# Patient Record
Sex: MALE | Race: BLACK OR AFRICAN AMERICAN | Employment: FULL TIME | URBAN - METROPOLITAN AREA
[De-identification: names, ages, dates, MRNs, and addresses within clinical notes are randomized per-mention and may not be internally consistent; named-entity substitution may affect disease eponyms.]

---

## 2020-11-13 ENCOUNTER — HOSPITAL ENCOUNTER (OUTPATIENT)
Dept: PREADMISSION TESTING | Age: 35
Discharge: HOME | End: 2020-11-13
Payer: COMMERCIAL

## 2020-11-13 ENCOUNTER — TRANSCRIBE ORDER (OUTPATIENT)
Dept: REGISTRATION | Age: 35
End: 2020-11-13

## 2020-11-13 DIAGNOSIS — Z01.812 BLOOD TESTS PRIOR TO TREATMENT OR PROCEDURE: ICD-10-CM

## 2020-11-13 DIAGNOSIS — Z20.828 EXPOSURE TO SARS-ASSOCIATED CORONAVIRUS: ICD-10-CM

## 2020-11-13 DIAGNOSIS — Z01.812 BLOOD TESTS PRIOR TO TREATMENT OR PROCEDURE: Primary | ICD-10-CM

## 2020-11-13 LAB
ERYTHROCYTE [DISTWIDTH] IN BLOOD BY AUTOMATED COUNT: 11.5 % (ref 11.6–14.5)
HCT VFR BLD AUTO: 46 % (ref 36–48)
HGB BLD-MCNC: 15.1 G/DL (ref 13–16)
MCH RBC QN AUTO: 31.9 PG (ref 24–34)
MCHC RBC AUTO-ENTMCNC: 32.8 G/DL (ref 31–37)
MCV RBC AUTO: 97 FL (ref 74–97)
PLATELET # BLD AUTO: 260 K/UL (ref 135–420)
PMV BLD AUTO: 10 FL (ref 9.2–11.8)
RBC # BLD AUTO: 4.74 M/UL (ref 4.7–5.5)
WBC # BLD AUTO: 4.4 K/UL (ref 4.6–13.2)

## 2020-11-13 PROCEDURE — 87635 SARS-COV-2 COVID-19 AMP PRB: CPT

## 2020-11-13 PROCEDURE — 85027 COMPLETE CBC AUTOMATED: CPT

## 2020-11-13 PROCEDURE — 36415 COLL VENOUS BLD VENIPUNCTURE: CPT

## 2020-11-14 LAB — SARS-COV-2, COV2NT: NOT DETECTED

## 2020-11-17 ENCOUNTER — ANESTHESIA EVENT (OUTPATIENT)
Dept: SURGERY | Age: 35
End: 2020-11-17
Payer: COMMERCIAL

## 2020-11-17 RX ORDER — LIDOCAINE HYDROCHLORIDE 10 MG/ML
0.1 INJECTION, SOLUTION EPIDURAL; INFILTRATION; INTRACAUDAL; PERINEURAL AS NEEDED
Status: CANCELLED | OUTPATIENT
Start: 2020-11-17

## 2020-11-17 RX ORDER — SODIUM CHLORIDE, SODIUM LACTATE, POTASSIUM CHLORIDE, CALCIUM CHLORIDE 600; 310; 30; 20 MG/100ML; MG/100ML; MG/100ML; MG/100ML
25 INJECTION, SOLUTION INTRAVENOUS CONTINUOUS
Status: CANCELLED | OUTPATIENT
Start: 2020-11-17 | End: 2020-11-18

## 2020-11-17 RX ORDER — FAMOTIDINE 20 MG/1
20 TABLET, FILM COATED ORAL ONCE
Status: CANCELLED | OUTPATIENT
Start: 2020-11-17 | End: 2020-11-17

## 2020-11-18 ENCOUNTER — APPOINTMENT (OUTPATIENT)
Dept: GENERAL RADIOLOGY | Age: 35
End: 2020-11-18
Attending: ORTHOPAEDIC SURGERY
Payer: COMMERCIAL

## 2020-11-18 ENCOUNTER — HOSPITAL ENCOUNTER (OUTPATIENT)
Age: 35
Setting detail: OUTPATIENT SURGERY
Discharge: HOME | End: 2020-11-18
Attending: ORTHOPAEDIC SURGERY | Admitting: ORTHOPAEDIC SURGERY
Payer: COMMERCIAL

## 2020-11-18 ENCOUNTER — ANESTHESIA (OUTPATIENT)
Dept: SURGERY | Age: 35
End: 2020-11-18
Payer: COMMERCIAL

## 2020-11-18 VITALS
TEMPERATURE: 98.5 F | DIASTOLIC BLOOD PRESSURE: 85 MMHG | RESPIRATION RATE: 18 BRPM | WEIGHT: 200.38 LBS | SYSTOLIC BLOOD PRESSURE: 118 MMHG | HEART RATE: 80 BPM | OXYGEN SATURATION: 97 % | HEIGHT: 69 IN | BODY MASS INDEX: 29.68 KG/M2

## 2020-11-18 LAB
ANION GAP SERPL CALC-SCNC: 4 MMOL/L (ref 3–18)
APTT PPP: 29.8 SEC (ref 23–36.4)
BUN SERPL-MCNC: 20 MG/DL (ref 7–18)
BUN/CREAT SERPL: 19 (ref 12–20)
CALCIUM SERPL-MCNC: 8.9 MG/DL (ref 8.5–10.1)
CHLORIDE SERPL-SCNC: 109 MMOL/L (ref 100–111)
CO2 SERPL-SCNC: 27 MMOL/L (ref 21–32)
CREAT SERPL-MCNC: 1.05 MG/DL (ref 0.6–1.3)
GLUCOSE SERPL-MCNC: 84 MG/DL (ref 74–99)
HISTORY CHECKED?,CKHIST: NORMAL
INR PPP: 1 (ref 0.8–1.2)
POTASSIUM SERPL-SCNC: 4.9 MMOL/L (ref 3.5–5.5)
PROTHROMBIN TIME: 13.1 SEC (ref 11.5–15.2)
SODIUM SERPL-SCNC: 140 MMOL/L (ref 136–145)

## 2020-11-18 PROCEDURE — 77030040356 HC CORD BPLR FRCP COVD -A: Performed by: ORTHOPAEDIC SURGERY

## 2020-11-18 PROCEDURE — 76210000016 HC OR PH I REC 1 TO 1.5 HR: Performed by: ORTHOPAEDIC SURGERY

## 2020-11-18 PROCEDURE — 74011000272 HC RX REV CODE- 272: Performed by: ORTHOPAEDIC SURGERY

## 2020-11-18 PROCEDURE — 86923 COMPATIBILITY TEST ELECTRIC: CPT

## 2020-11-18 PROCEDURE — 77030003028 HC SUT VCRL J&J -A: Performed by: ORTHOPAEDIC SURGERY

## 2020-11-18 PROCEDURE — 00630 ANES PX LUMBAR REGION NOS: CPT | Performed by: ANESTHESIOLOGY

## 2020-11-18 PROCEDURE — 77030018723 HC ELCTRD BLD COVD -A: Performed by: ORTHOPAEDIC SURGERY

## 2020-11-18 PROCEDURE — 74011000250 HC RX REV CODE- 250: Performed by: NURSE ANESTHETIST, CERTIFIED REGISTERED

## 2020-11-18 PROCEDURE — 74011250637 HC RX REV CODE- 250/637: Performed by: NURSE ANESTHETIST, CERTIFIED REGISTERED

## 2020-11-18 PROCEDURE — 74011000250 HC RX REV CODE- 250: Performed by: ORTHOPAEDIC SURGERY

## 2020-11-18 PROCEDURE — 86900 BLOOD TYPING SEROLOGIC ABO: CPT

## 2020-11-18 PROCEDURE — 85730 THROMBOPLASTIN TIME PARTIAL: CPT

## 2020-11-18 PROCEDURE — 77030013079 HC BLNKT BAIR HGGR 3M -A: Performed by: ANESTHESIOLOGY

## 2020-11-18 PROCEDURE — 00630 ANES PX LUMBAR REGION NOS: CPT | Performed by: NURSE ANESTHETIST, CERTIFIED REGISTERED

## 2020-11-18 PROCEDURE — 74011250636 HC RX REV CODE- 250/636: Performed by: NURSE ANESTHETIST, CERTIFIED REGISTERED

## 2020-11-18 PROCEDURE — 77030031139 HC SUT VCRL2 J&J -A: Performed by: ORTHOPAEDIC SURGERY

## 2020-11-18 PROCEDURE — 80048 BASIC METABOLIC PNL TOTAL CA: CPT

## 2020-11-18 PROCEDURE — 85610 PROTHROMBIN TIME: CPT

## 2020-11-18 PROCEDURE — 72100 X-RAY EXAM L-S SPINE 2/3 VWS: CPT

## 2020-11-18 PROCEDURE — 77030032490 HC SLV COMPR SCD KNE COVD -B: Performed by: ORTHOPAEDIC SURGERY

## 2020-11-18 PROCEDURE — 76210000023 HC REC RM PH II 2 TO 2.5 HR: Performed by: ORTHOPAEDIC SURGERY

## 2020-11-18 PROCEDURE — 77030034479 HC ADH SKN CLSR PRINEO J&J -B: Performed by: ORTHOPAEDIC SURGERY

## 2020-11-18 PROCEDURE — 77030003666 HC NDL SPINAL BD -A: Performed by: ORTHOPAEDIC SURGERY

## 2020-11-18 PROCEDURE — 76060000035 HC ANESTHESIA 2 TO 2.5 HR: Performed by: ORTHOPAEDIC SURGERY

## 2020-11-18 PROCEDURE — 2709999900 HC NON-CHARGEABLE SUPPLY: Performed by: ORTHOPAEDIC SURGERY

## 2020-11-18 PROCEDURE — 76010000131 HC OR TIME 2 TO 2.5 HR: Performed by: ORTHOPAEDIC SURGERY

## 2020-11-18 PROCEDURE — 74011250636 HC RX REV CODE- 250/636: Performed by: ORTHOPAEDIC SURGERY

## 2020-11-18 PROCEDURE — 77030018673: Performed by: ORTHOPAEDIC SURGERY

## 2020-11-18 PROCEDURE — 77030008477 HC STYL SATN SLP COVD -A: Performed by: ANESTHESIOLOGY

## 2020-11-18 PROCEDURE — 77030008683 HC TU ET CUF COVD -A: Performed by: ANESTHESIOLOGY

## 2020-11-18 RX ORDER — PROPOFOL 10 MG/ML
INJECTION, EMULSION INTRAVENOUS AS NEEDED
Status: DISCONTINUED | OUTPATIENT
Start: 2020-11-18 | End: 2020-11-18 | Stop reason: HOSPADM

## 2020-11-18 RX ORDER — NALOXONE HYDROCHLORIDE 0.4 MG/ML
0.4 INJECTION, SOLUTION INTRAMUSCULAR; INTRAVENOUS; SUBCUTANEOUS AS NEEDED
Status: DISCONTINUED | OUTPATIENT
Start: 2020-11-18 | End: 2020-11-18 | Stop reason: HOSPADM

## 2020-11-18 RX ORDER — HYDROMORPHONE HYDROCHLORIDE 1 MG/ML
INJECTION, SOLUTION INTRAMUSCULAR; INTRAVENOUS; SUBCUTANEOUS AS NEEDED
Status: DISCONTINUED | OUTPATIENT
Start: 2020-11-18 | End: 2020-11-18 | Stop reason: HOSPADM

## 2020-11-18 RX ORDER — SODIUM CHLORIDE 9 MG/ML
250 INJECTION, SOLUTION INTRAVENOUS AS NEEDED
Status: DISCONTINUED | OUTPATIENT
Start: 2020-11-18 | End: 2020-11-18 | Stop reason: HOSPADM

## 2020-11-18 RX ORDER — GLYCOPYRROLATE 0.2 MG/ML
INJECTION INTRAMUSCULAR; INTRAVENOUS AS NEEDED
Status: DISCONTINUED | OUTPATIENT
Start: 2020-11-18 | End: 2020-11-18 | Stop reason: HOSPADM

## 2020-11-18 RX ORDER — FENTANYL CITRATE 50 UG/ML
INJECTION, SOLUTION INTRAMUSCULAR; INTRAVENOUS AS NEEDED
Status: DISCONTINUED | OUTPATIENT
Start: 2020-11-18 | End: 2020-11-18 | Stop reason: HOSPADM

## 2020-11-18 RX ORDER — LIDOCAINE HYDROCHLORIDE 10 MG/ML
0.1 INJECTION, SOLUTION EPIDURAL; INFILTRATION; INTRACAUDAL; PERINEURAL AS NEEDED
Status: DISCONTINUED | OUTPATIENT
Start: 2020-11-18 | End: 2020-11-18 | Stop reason: HOSPADM

## 2020-11-18 RX ORDER — DEXAMETHASONE SODIUM PHOSPHATE 4 MG/ML
INJECTION, SOLUTION INTRA-ARTICULAR; INTRALESIONAL; INTRAMUSCULAR; INTRAVENOUS; SOFT TISSUE AS NEEDED
Status: DISCONTINUED | OUTPATIENT
Start: 2020-11-18 | End: 2020-11-18 | Stop reason: HOSPADM

## 2020-11-18 RX ORDER — LIDOCAINE HYDROCHLORIDE 20 MG/ML
INJECTION, SOLUTION EPIDURAL; INFILTRATION; INTRACAUDAL; PERINEURAL AS NEEDED
Status: DISCONTINUED | OUTPATIENT
Start: 2020-11-18 | End: 2020-11-18 | Stop reason: HOSPADM

## 2020-11-18 RX ORDER — HYDROMORPHONE HYDROCHLORIDE 1 MG/ML
0.5 INJECTION, SOLUTION INTRAMUSCULAR; INTRAVENOUS; SUBCUTANEOUS
Status: DISCONTINUED | OUTPATIENT
Start: 2020-11-18 | End: 2020-11-18 | Stop reason: HOSPADM

## 2020-11-18 RX ORDER — SODIUM CHLORIDE, SODIUM LACTATE, POTASSIUM CHLORIDE, CALCIUM CHLORIDE 600; 310; 30; 20 MG/100ML; MG/100ML; MG/100ML; MG/100ML
75 INJECTION, SOLUTION INTRAVENOUS CONTINUOUS
Status: DISCONTINUED | OUTPATIENT
Start: 2020-11-18 | End: 2020-11-18 | Stop reason: HOSPADM

## 2020-11-18 RX ORDER — FENTANYL CITRATE 50 UG/ML
50 INJECTION, SOLUTION INTRAMUSCULAR; INTRAVENOUS AS NEEDED
Status: DISCONTINUED | OUTPATIENT
Start: 2020-11-18 | End: 2020-11-18 | Stop reason: HOSPADM

## 2020-11-18 RX ORDER — MIDAZOLAM HYDROCHLORIDE 1 MG/ML
INJECTION, SOLUTION INTRAMUSCULAR; INTRAVENOUS AS NEEDED
Status: DISCONTINUED | OUTPATIENT
Start: 2020-11-18 | End: 2020-11-18 | Stop reason: HOSPADM

## 2020-11-18 RX ORDER — SODIUM CHLORIDE 0.9 % (FLUSH) 0.9 %
5-40 SYRINGE (ML) INJECTION AS NEEDED
Status: DISCONTINUED | OUTPATIENT
Start: 2020-11-18 | End: 2020-11-18 | Stop reason: HOSPADM

## 2020-11-18 RX ORDER — FAMOTIDINE 20 MG/1
20 TABLET, FILM COATED ORAL ONCE
Status: COMPLETED | OUTPATIENT
Start: 2020-11-18 | End: 2020-11-18

## 2020-11-18 RX ORDER — VECURONIUM BROMIDE FOR INJECTION 1 MG/ML
INJECTION, POWDER, LYOPHILIZED, FOR SOLUTION INTRAVENOUS AS NEEDED
Status: DISCONTINUED | OUTPATIENT
Start: 2020-11-18 | End: 2020-11-18 | Stop reason: HOSPADM

## 2020-11-18 RX ORDER — ONDANSETRON 2 MG/ML
4 INJECTION INTRAMUSCULAR; INTRAVENOUS ONCE
Status: COMPLETED | OUTPATIENT
Start: 2020-11-18 | End: 2020-11-18

## 2020-11-18 RX ORDER — CEFAZOLIN SODIUM 2 G/50ML
2 SOLUTION INTRAVENOUS
Status: COMPLETED | OUTPATIENT
Start: 2020-11-18 | End: 2020-11-18

## 2020-11-18 RX ORDER — SODIUM CHLORIDE, SODIUM LACTATE, POTASSIUM CHLORIDE, CALCIUM CHLORIDE 600; 310; 30; 20 MG/100ML; MG/100ML; MG/100ML; MG/100ML
25 INJECTION, SOLUTION INTRAVENOUS CONTINUOUS
Status: DISCONTINUED | OUTPATIENT
Start: 2020-11-18 | End: 2020-11-18 | Stop reason: HOSPADM

## 2020-11-18 RX ORDER — BUPIVACAINE HYDROCHLORIDE 5 MG/ML
INJECTION, SOLUTION EPIDURAL; INTRACAUDAL AS NEEDED
Status: DISCONTINUED | OUTPATIENT
Start: 2020-11-18 | End: 2020-11-18 | Stop reason: HOSPADM

## 2020-11-18 RX ORDER — SODIUM CHLORIDE 0.9 % (FLUSH) 0.9 %
5-40 SYRINGE (ML) INJECTION EVERY 8 HOURS
Status: DISCONTINUED | OUTPATIENT
Start: 2020-11-18 | End: 2020-11-18 | Stop reason: HOSPADM

## 2020-11-18 RX ORDER — NEOSTIGMINE METHYLSULFATE 1 MG/ML
INJECTION, SOLUTION INTRAVENOUS AS NEEDED
Status: DISCONTINUED | OUTPATIENT
Start: 2020-11-18 | End: 2020-11-18 | Stop reason: HOSPADM

## 2020-11-18 RX ORDER — SUCCINYLCHOLINE CHLORIDE 100 MG/5ML
SYRINGE (ML) INTRAVENOUS AS NEEDED
Status: DISCONTINUED | OUTPATIENT
Start: 2020-11-18 | End: 2020-11-18 | Stop reason: HOSPADM

## 2020-11-18 RX ORDER — ONDANSETRON 2 MG/ML
INJECTION INTRAMUSCULAR; INTRAVENOUS AS NEEDED
Status: DISCONTINUED | OUTPATIENT
Start: 2020-11-18 | End: 2020-11-18 | Stop reason: HOSPADM

## 2020-11-18 RX ADMIN — VECURONIUM BROMIDE FOR INJECTION 1 MG: 1 INJECTION, POWDER, LYOPHILIZED, FOR SOLUTION INTRAVENOUS at 08:28

## 2020-11-18 RX ADMIN — Medication 3 MG: at 09:55

## 2020-11-18 RX ADMIN — Medication 100 MG: at 08:09

## 2020-11-18 RX ADMIN — HYDROMORPHONE HYDROCHLORIDE 0.5 MG: 1 INJECTION, SOLUTION INTRAMUSCULAR; INTRAVENOUS; SUBCUTANEOUS at 11:00

## 2020-11-18 RX ADMIN — FAMOTIDINE 20 MG: 20 TABLET ORAL at 06:45

## 2020-11-18 RX ADMIN — CEFAZOLIN 2 G: 10 INJECTION, POWDER, FOR SOLUTION INTRAVENOUS at 08:18

## 2020-11-18 RX ADMIN — VECURONIUM BROMIDE FOR INJECTION 1 MG: 1 INJECTION, POWDER, LYOPHILIZED, FOR SOLUTION INTRAVENOUS at 09:21

## 2020-11-18 RX ADMIN — GLYCOPYRROLATE 0.4 MG: 0.2 INJECTION INTRAMUSCULAR; INTRAVENOUS at 09:55

## 2020-11-18 RX ADMIN — SODIUM CHLORIDE, SODIUM LACTATE, POTASSIUM CHLORIDE, AND CALCIUM CHLORIDE 25 ML/HR: 600; 310; 30; 20 INJECTION, SOLUTION INTRAVENOUS at 06:43

## 2020-11-18 RX ADMIN — PROPOFOL 200 MG: 10 INJECTION, EMULSION INTRAVENOUS at 08:09

## 2020-11-18 RX ADMIN — VECURONIUM BROMIDE FOR INJECTION 1 MG: 1 INJECTION, POWDER, LYOPHILIZED, FOR SOLUTION INTRAVENOUS at 08:09

## 2020-11-18 RX ADMIN — HYDROMORPHONE HYDROCHLORIDE 0.5 MG: 1 INJECTION, SOLUTION INTRAMUSCULAR; INTRAVENOUS; SUBCUTANEOUS at 09:58

## 2020-11-18 RX ADMIN — VECURONIUM BROMIDE FOR INJECTION 4 MG: 1 INJECTION, POWDER, LYOPHILIZED, FOR SOLUTION INTRAVENOUS at 08:19

## 2020-11-18 RX ADMIN — DEXAMETHASONE SODIUM PHOSPHATE 4 MG: 4 INJECTION, SOLUTION INTRA-ARTICULAR; INTRALESIONAL; INTRAMUSCULAR; INTRAVENOUS; SOFT TISSUE at 08:38

## 2020-11-18 RX ADMIN — FENTANYL CITRATE 100 MCG: 50 INJECTION, SOLUTION INTRAMUSCULAR; INTRAVENOUS at 08:09

## 2020-11-18 RX ADMIN — HYDROMORPHONE HYDROCHLORIDE 0.5 MG: 1 INJECTION, SOLUTION INTRAMUSCULAR; INTRAVENOUS; SUBCUTANEOUS at 08:37

## 2020-11-18 RX ADMIN — ONDANSETRON 4 MG: 2 SOLUTION INTRAMUSCULAR; INTRAVENOUS at 09:43

## 2020-11-18 RX ADMIN — VECURONIUM BROMIDE FOR INJECTION 1 MG: 1 INJECTION, POWDER, LYOPHILIZED, FOR SOLUTION INTRAVENOUS at 08:51

## 2020-11-18 RX ADMIN — ONDANSETRON 4 MG: 2 INJECTION INTRAMUSCULAR; INTRAVENOUS at 13:08

## 2020-11-18 RX ADMIN — FENTANYL CITRATE 50 MCG: 50 INJECTION, SOLUTION INTRAMUSCULAR; INTRAVENOUS at 10:45

## 2020-11-18 RX ADMIN — MIDAZOLAM 2 MG: 1 INJECTION INTRAMUSCULAR; INTRAVENOUS at 08:04

## 2020-11-18 RX ADMIN — LIDOCAINE HYDROCHLORIDE 100 MG: 20 INJECTION, SOLUTION INTRAVENOUS at 08:09

## 2020-11-18 RX ADMIN — HYDROMORPHONE HYDROCHLORIDE 0.5 MG: 1 INJECTION, SOLUTION INTRAMUSCULAR; INTRAVENOUS; SUBCUTANEOUS at 10:38

## 2020-11-18 NOTE — OP NOTES
[facility]  OPERATIVE REPORT    Name:  Orin Fleischer  MR#:   [id number]  :  1985  ACCOUNT #:  [de-identified]  DATE OF SERVICE:  2020    PREOPERATIVE DIAGNOSES:  Left L5-S1 disk herniation. A.  Extruded. POSTOPERATIVE DIAGNOSES:  Left L5-S1 disk herniation. A.  Extruded. PROCEDURE PERFORMED:  Excision of left L5-S1 disk herniation, exploration and decompression of left S1 nerve root. SURGEON:  CRIS Shepherd MD    ASSISTANT:  Corona Diez CSA    ANESTHESIA:  General.    COMPLICATIONS:  None. SPECIMENS REMOVED:  None. IMPLANTS:  000    ESTIMATED BLOOD LOSS:  25 mL. FINDINGS:  The patient had an extruded left central L5-S1 disk herniation with fragment lying outside the annulus but under the cover of the posterior longitudinal ligament producing compression of the left S1 nerve root. PROCEDURE:  Under general anesthesia, the patient rolled in prone position on Devaughn frame, peripheral nerves padded, back prepped and draped in a sterile fashion. Midline incision made L5-S1 carried to the spinous processes. Spinal needle placed L5 spinous process and position confirmed with lateral C-arm and AP C-arm. Curvilinear fascial flap folded to the right and left paraspinal muscles elevated L5-S1. Visualization and palpation of sacrum used to further identify level. Left L5-S1 laminotomy performed with Midas Bc drill and Kerrison rongeurs. Ligamentum flavum removed with a knife. Laminotomy widened to the left S1 pedicle and overhanging left S1 superior articular facet resected relieving subarticular stenosis. Left S1 nerve root and dural tube mobilized medially exposing the underlying extruded left L5-S1 disk herniation. The disk herniation was excised in total with knife, curettes and grabbers. At the end of decompression, nerve hook could be placed over the S1 body, L5-S1 interspace, L5 body without further disk fragments or nerve root compression. Ball probe placed around the S1 pedicle into the foramen without compression. Wound copiously irrigated and closed in layers with powdered vancomycin in deep subcutaneous tissue. A 0.5% plain Marcaine about the left L5-S1 facet joint and in the subcutaneous tissue. Wound dressed. The patient awakened, taken to recovery room in satisfactory condition without complication. Estimated blood loss 25 mL. No blood products. 700 mL crystalloid fluid. No Marcum was placed. No specimen. No tubes or drain. Needle and sponge count correct without complication. In the recovery room, lower extremity motor intact, but patient not awakened sufficiently to allow grading.       Kamila Cloud III, MD      [id number]  D:  11/18/2020 10:26  T:  11/18/2020 15:48  JOB #:  [id number]  CC:  MD Caio Wallace MD

## 2020-11-18 NOTE — ANESTHESIA POSTPROCEDURE EVALUATION
Procedure(s):  Left L5-S1 Discectomy. general    Anesthesia Post Evaluation      Multimodal analgesia: multimodal analgesia used between 6 hours prior to anesthesia start to PACU discharge  Patient location during evaluation: PACU  Patient participation: complete - patient participated  Level of consciousness: awake and alert  Pain management: adequate  Airway patency: patent  Anesthetic complications: no  Cardiovascular status: acceptable  Respiratory status: acceptable  Hydration status: acceptable  Post anesthesia nausea and vomiting:  controlled  Final Post Anesthesia Temperature Assessment:  Normothermia (36.0-37.5 degrees C)      INITIAL Post-op Vital signs:   Vitals Value Taken Time   /91 11/18/2020 11:10 AM   Temp 36.6 °C (97.9 °F) 11/18/2020 11:10 AM   Pulse 77 11/18/2020 11:18 AM   Resp 15 11/18/2020 11:18 AM   SpO2 92 % 11/18/2020 11:18 AM   Vitals shown include unvalidated device data.

## 2020-11-18 NOTE — PERIOP NOTES
Pre-Op Summary    Pt arrived via car with family/friend and is oriented to time, place, person and situation. Patient with steady gait with none assistive devices. Visit Vitals  /80 (BP 1 Location: Left arm, BP Patient Position: At rest)   Pulse 74   Temp 98.2 °F (36.8 °C)   Resp 14   Ht 5' 9\" (1.753 m)   Wt 90.9 kg (200 lb 6 oz)   SpO2 99%   BMI 29.59 kg/m²       Peripheral IV located on Left hand . Patients belongings are located locked in store room. Patient's point of contact is Emily Myles, mother and their contact number is: [phone]. They will be called for  and updates. They are able to receive medication information. They will be their ride home.

## 2020-11-18 NOTE — ANESTHESIA PREPROCEDURE EVALUATION
Relevant Problems   No relevant active problems       Anesthetic History   No history of anesthetic complications            Review of Systems / Medical History  Patient summary reviewed and pertinent labs reviewed    Pulmonary            Asthma : well controlled       Neuro/Psych   Within defined limits           Cardiovascular  Within defined limits                     GI/Hepatic/Renal  Within defined limits              Endo/Other             Other Findings   Comments: G6PD deficiency           Physical Exam    Airway  Mallampati: I  TM Distance: > 6 cm  Neck ROM: normal range of motion   Mouth opening: Normal     Cardiovascular  Regular rate and rhythm,  S1 and S2 normal,  no murmur, click, rub, or gallop             Dental  No notable dental hx       Pulmonary  Breath sounds clear to auscultation               Abdominal  GI exam deferred       Other Findings            Anesthetic Plan    ASA: 2  Anesthesia type: general          Induction: Intravenous  Anesthetic plan and risks discussed with: Patient

## 2020-11-18 NOTE — OP NOTES
BRIEF OPERATIVE NOTE    Date of Procedure: 11/18/2020     Preoperative Diagnosis: M51.26    Postoperative Diagnosis: M51.26      Procedure: Procedure(s):  Left L5-S1 Discectomy    Surgeon(s) and Role: Jazmin Lassiter MD - Primary    Anesthesia: General     Findings: the pt had an extruded L L5-S1 hnp     Estimated Blood Loss: 25  Replaced0      [id number]        Urine0    Specimens: * No specimens in log *     Tubes/Drains: None    Needle/sponge count:  Correct    Complications: 0  IN RR lower extremity motor intact    Plan    HOme later today if pain under control, is able to get oob, is voiding and neuro intact. If not admit overnight and home in am  Home with percocet Percoet escribed in.  rto 2 wks.

## 2020-11-18 NOTE — PERIOP NOTES
.   Report received from Mary Frederick. 28.:    11/18/20 1040 11/18/20 1054 11/18/20 1110 11/18/20 1121   BP: 105/75 132/83 (!) 125/91 118/85   Pulse: 78 75 74 80   Resp: 18 14 12 18   Temp:    98.5 °F (36.9 °C)   SpO2: 100% 100% 97%    Weight:       Height:           Patient is oriented to time, place, person and situation    Patient OBB chair with minimal assistance, walked twice 100 each time and tolerating fluids and activity. Vital signs stable. Pain tolerable. Urinated 300 ml of light hannah urine. Lines and Drains  Peripheral Intravenous Line:   Peripheral IV 11/18/20 Hand (Active)   Site Assessment Clean, dry, & intact 11/18/20 1122   Phlebitis Assessment 0 11/18/20 1122   Infiltration Assessment 0 11/18/20 1122   Dressing Status Clean, dry, & intact 11/18/20 1122   Dressing Type Tape;Transparent 11/18/20 1122   Hub Color/Line Status Pink 11/18/20 1122   Alcohol Cap Used Yes 11/18/20 1122          Discharge instructions were given to patient and discussed with [facility] mother Yahir Merino , and their contact number is: [phone] (via telephone. ..due to to emergency protocols in place). Medications were reviewed also. No questions or concerns at this time. Patient had time to ask any questions. Patient and caregiver verbalized understanding of discharge instructions. Patient discharged home with  family care.        Tammy Sheriff RN

## 2020-11-18 NOTE — DISCHARGE INSTRUCTIONS
t   Patient Education        Microdiscectomy in the Low Back: What to Expect at [facility] is surgery to remove part or all of a bulging or damaged (herniated) disc in the lower spine. The disc may press on the spinal nerves and cause pain and numbness in the buttock and leg. Your doctor made a 1- to 2-inch cut (incision) in the skin over the spine. The doctor put surgical tools through the incision and then used a special microscope (scope) to view the area. You can expect your back to feel stiff or sore after surgery. This should improve in the weeks after surgery. You may have relief from your symptoms right away, or you may get better over days or weeks. In the weeks after your surgery, it may be hard to sit or  one position for very long. You may need pain medicine. It may take up to 8 weeks to get back to doing your usual activities. Your doctor may advise you to work with a physical therapist to strengthen the muscles around your spine and trunk. You will need to learn how to lift, twist, and bend so you don't put too much strain on your back. This care sheet gives you a general idea about how long it will take for you to recover. But each person recovers at a different pace. Follow the steps below to get better as quickly as possible. How can you care for yourself at home? Incision care    · If you have strips of tape on the cut (incision) the doctor made, leave the tape on for a week or until it falls off.     · Wash the area daily with warm, soapy water, and pat it dry. Don't use hydrogen peroxide or alcohol, which can slow healing. You may cover the area with a gauze bandage if it weeps or rubs against clothing. Change the bandage every day.     · Keep the area clean and dry. Medicines    · Your doctor will tell you if and when you can restart your medicines. He or she will also give you instructions about taking any new medicines.     · Be safe with medicines. Take pain medicines exactly as directed. ? If the doctor gave you a prescription medicine for pain, take it as prescribed. ? If you are not taking a prescription pain medicine, ask your doctor if you can take an over-the-counter medicine.     · If you think your pain medicine is making you sick to your stomach:  ? Take your medicine after meals (unless your doctor has told you not to). ? Ask your doctor for a different pain medicine.     · If your doctor prescribed antibiotics, take them as directed. Do not stop taking them just because you feel better. You need to take the full course of antibiotics. Diet    · You can eat your normal diet. If your stomach is upset, try bland, low-fat foods like plain rice, broiled chicken, toast, and yogurt.     · Drink plenty of fluids (unless your doctor tells you not to).     · You may notice that your bowel movements are not regular right after your surgery. This is common. Try to avoid constipation and straining with bowel movements. You may want to take a fiber supplement every day. If you have not had a bowel movement after a couple of days, ask your doctor about taking a mild laxative. Activity    · Rest when you feel tired. Getting enough sleep will help you recover.     · Try to walk each day. Start by walking a little more than you did the day before. Bit by bit, increase the amount you walk. Walking boosts blood flow and helps prevent pneumonia and constipation.     · Avoid lifting anything that would make you strain. This may include heavy grocery bags and milk containers, a heavy briefcase or backpack, cat litter or dog food bags, a vacuum , or a child.     · Try to change your position about every 30 minutes while you sit or stand.  This will help decrease your back pain while you heal.     · Avoid strenuous activities, such as bicycle riding, jogging, weight lifting, or aerobic exercise, until your doctor says it is okay.     · Ask your doctor when you can drive again.     · Avoid riding in a car for more than 30 minutes at a time for 2 to 4 weeks after surgery. If you must ride in a car for a longer distance, stop often to walk and stretch your legs.     · Your time off from work depends on how quickly you feel better and on the type of work you do. If you work in an office, you likely can go back to work sooner than if you have a job where you are very active. Talk with your doctor about your work needs.     · You may have sex as soon as you feel able, but avoid positions that put stress on your back or cause pain. Exercise    · Do back exercises as instructed by your doctor.     · Your doctor may recommend that you work with a physical therapist to improve the strength and flexibility of your back. Other instructions    · To reduce stiffness and help sore muscles, use a warm water bottle, a heating pad set on low, or a warm cloth on your back. Do not put heat right over the incision. Do not go to sleep with a heating pad on your skin. Follow-up care is a key part of your treatment and safety. Be sure to make and go to all appointments, and call your doctor if you are having problems. It's also a good idea to know your test results and keep a list of the medicines you take. When should you call for help? Call 911 anytime you think you may need emergency care. For example, call if:    · You passed out (lost consciousness).     · You have chest pain, are short of breath, or cough up blood.     · You can't move an arm or a leg at all. Call your doctor now or seek immediate medical care if:    · You have pain that does not get better after you take pain medicine.     · You have loose stitches, or your incision comes open.     · Bright red blood has soaked through the bandage over your incision.     · You have signs of a blood clot in your leg (called deep vein thrombosis), such as:  ? Pain in your calf, back of the knee, thigh, or groin. ?  Redness or swelling in your leg.     · You have symptoms of infection such as:  ? Increased pain, swelling, warmth, or redness. ? Red streaks leading from the incision. ? Pus draining from the incision. ? A fever.     · You have new or worse symptoms in your arms, legs, chest, belly, or buttocks. Symptoms may include:  ? Numbness or tingling. ? Weakness. ? Pain.     · You lose bladder or bowel control. Watch closely for changes in your health, and be sure to contact your doctor if:    · You do not get better as expected. Where can you learn more? Go to http://www.gray.com/  Enter C737 in the search box to learn more about \"Microdiscectomy in the Low Back: What to Expect at Home. \"  Current as of: March 2, 2020               Content Version: 12.6  © 4749-1767 "[employer]. Care instructions adapted under license by [employer] (which disclaims liability or warranty for this information). If you have questions about a medical condition or this instruction, always ask your healthcare professional. [place] [address] any warranty or liability for your use of this information. Patient Education        Learning About Coronavirus [phone])  Coronavirus [phone]): Overview  What is coronavirus ([id number])? The coronavirus disease (COVID-19) is caused by a virus. It is an illness that was first found in December 2019. It has since spread worldwide. The virus can cause fever, cough, and trouble breathing. In severe cases, it can cause pneumonia and make it hard to breathe without help. It can cause death. This virus spreads person-to-person through droplets from coughing and sneezing. It can also spread when you are close to someone who is infected. And it can spread when you touch something that has the virus on it, such as a doorknob or a tabletop. Coronaviruses are a large group of viruses. They cause the common cold.  They also cause more serious illnesses like Middle East respiratory syndrome (MERS) and severe acute respiratory syndrome (SARS). COVID-19 is caused by a novel coronavirus. That means it's a new type that has not been seen in people before. How is COVID-19 treated? Mild illness can be treated at home, but more serious illness needs to be treated in the hospital. Treatment may include medicines to reduce symptoms, plus breathing support such as oxygen therapy or a ventilator. Other treatments, such as antiviral medicines, may help people who have COVID-19. What can you do to protect yourself from COVID-19? The best way to protect yourself from getting sick is to:  · Avoid areas where there is an outbreak. · Avoid contact with people who may be infected. · Avoid crowds and try to stay at least 6 feet away from other people. · Wash your hands often, especially after you cough or sneeze. Use soap and water, and scrub for at least 20 seconds. If soap and water aren't available, use an alcohol-based hand . · Avoid touching your mouth, nose, and eyes. What can you do to avoid spreading the virus to others? To help avoid spreading the virus to others:  · Wash your hands often with soap or alcohol-based hand sanitizers. · Cover your mouth with a tissue when you cough or sneeze. Then throw the tissue in the trash. · Use a disinfectant to clean things that you touch often. These include doorknobs, remote controls, phones, and handles on your refrigerator and microwave. And don't forget countertops, tabletops, bathrooms, and computer keyboards. · Wear a cloth face cover if you have to go to public areas. If you know or suspect that you have COVID-19:  · Stay home. Don't go to school, work, or public areas. And don't use public transportation, ride-shares, or taxis unless you have no choice. · Leave your home only if you need to get medical care or testing. But call the doctor's office first so they know you're coming.  And wear a face cover. · Limit contact with people in your home. If possible, stay in a separate bedroom and use a separate bathroom. · Wear a face cover whenever you're around other people. It can help stop the spread of the virus when you cough or sneeze. · Clean and disinfect your home every day. Use household  and disinfectant wipes or sprays. Take special care to clean things that you grab with your hands. · Self-isolate until it's safe to be around others again. ? If you have symptoms, it's safe when you haven't had a fever for 3 days and your symptoms have improved and it's been at least 10 days since your symptoms started. ? If you were exposed to the virus but don't have symptoms, it's safe to be around others 14 days after exposure. ? Talk to your doctor about whether you also need testing, especially if you have a weakened immune system. When to call for help  Call 911 anytime you think you may need emergency care. For example, call if:  · You have severe trouble breathing. (You can't talk at all.)  · You have constant chest pain or pressure. · You are severely dizzy or lightheaded. · You are confused or can't think clearly. · Your face and lips have a blue color. · You passed out (lost consciousness) or are very hard to wake up. Call your doctor now if you develop symptoms such as:  · Shortness of breath. · Fever. · Cough. If you need to get care, call ahead to the doctor's office for instructions before you go. Make sure you wear a face cover to prevent exposing other people to the virus. Where can you get the latest information? The following health organizations are tracking and studying this virus. Their websites contain the most up-to-date information. [place] also learn what to do if you think you may have been exposed to the virus. · U.S. Centers for Disease Control and Prevention (CDC): The CDC provides updated news about the disease and travel advice.  The website also tells you how to prevent the spread of infection. www.cdc.gov  · World Health Organization [facility]): WHO offers information about the virus outbreaks. WHO also has travel advice. www.who.int  Current as of: July 10, 2020               Content Version: 12.6  © 9590-4970 [employer]. Care instructions adapted under license by [employer] (which disclaims liability or warranty for this information). If you have questions about a medical condition or this instruction, always ask your healthcare professional. [place] [address] any warranty or liability for your use of this information.          .Patient armband removed and shredded

## 2020-11-18 NOTE — H&P
Day of Surgery Update:  Magdy Nelson was seen and examined. History and physical has been reviewed. The patient has been examined.  There have been no significant clinical changes since the completion of the originally dated History and Physical.    Signed By: Kyra Cm MD     November 18, 2020 7:56 AM

## 2020-11-22 LAB
ABO + RH BLD: NORMAL
BLD PROD TYP BPU: NORMAL
BLD PROD TYP BPU: NORMAL
BLOOD GROUP ANTIBODIES SERPL: NORMAL
BPU ID: NORMAL
BPU ID: NORMAL
CROSSMATCH RESULT,%XM: NORMAL
CROSSMATCH RESULT,%XM: NORMAL
SPECIMEN EXP DATE BLD: NORMAL
STATUS OF UNIT,%ST: NORMAL
STATUS OF UNIT,%ST: NORMAL
UNIT DIVISION, %UDIV: 0
UNIT DIVISION, %UDIV: 0

## (undated) DEVICE — SUTURE VCRL SZ 4-0 L27IN ABSRB UD L19MM PS-2 3/8 CIR PRIM J426H

## (undated) DEVICE — SUTURE VCRL SZ 0 L36IN ABSRB UD L48MM CTX 1/2 CIR J978H

## (undated) DEVICE — NEEDLE HYPO 25GA L1.5IN BLU POLYPR HUB S STL REG BVL STR

## (undated) DEVICE — TRAY PREP DRY W/ PREM GLV 2 APPL 6 SPNG 2 UNDPD 1 OVERWRAP

## (undated) DEVICE — SUTURE COAT VCRL PC 5 PRECIS COSM CONVENTIONAL CUT PRIM 3 8 J823H

## (undated) DEVICE — STERILE POLYISOPRENE POWDER-FREE SURGICAL GLOVES: Brand: PROTEXIS

## (undated) DEVICE — PACKING 8004050 NEURAY 200PK 7X152MM: Brand: NEURAY ®

## (undated) DEVICE — 10FR FRAZIER SUCTION HANDLE: Brand: CARDINAL HEALTH

## (undated) DEVICE — INTENDED FOR TISSUE SEPARATION, AND OTHER PROCEDURES THAT REQUIRE A SHARP SURGICAL BLADE TO PUNCTURE OR CUT.: Brand: BARD-PARKER SAFETY BLADES SIZE 10, STERILE

## (undated) DEVICE — SYR 10ML LUER LOK 1/5ML GRAD --

## (undated) DEVICE — GAUZE,SPONGE,8"X4",12PLY,XRAY,STRL,LF: Brand: MEDLINE

## (undated) DEVICE — TOWEL SURG W16XL26IN BLU NONFENESTRATED DLX ST 2 PER PK

## (undated) DEVICE — 12FR FRAZIER SUCTION HANDLE: Brand: CARDINAL HEALTH

## (undated) DEVICE — KENDALL SCD EXPRESS SLEEVES, KNEE LENGTH, MEDIUM: Brand: KENDALL SCD

## (undated) DEVICE — 3M™ IOBAN™ 2 ANTIMICROBIAL INCISE DRAPE 6650EZ: Brand: IOBAN™ 2

## (undated) DEVICE — 3M™ STERI-STRIP™ REINFORCED ADHESIVE SKIN CLOSURES, R1548, 1 IN X 5 IN (25 MM X 125 MM), 4 STRIPS/ENVELOPE: Brand: 3M™ STERI-STRIP™

## (undated) DEVICE — REM POLYHESIVE ADULT PATIENT RETURN ELECTRODE: Brand: VALLEYLAB

## (undated) DEVICE — PACKING 8004051 NEURAY 200PK 13X152MM: Brand: NEURAY ®

## (undated) DEVICE — SUTURE COAT VCRL SZ 0 L18IN ABSRB UD CTX L48MM 1/2 CIR J724D

## (undated) DEVICE — BIPOLAR FORCEPS CORD: Brand: VALLEYLAB

## (undated) DEVICE — ELECTRODE BLDE L4IN NONINSULATED EDGE

## (undated) DEVICE — PREP SKN DURAPREP 26ML APPL --

## (undated) DEVICE — INTENDED FOR TISSUE SEPARATION, AND OTHER PROCEDURES THAT REQUIRE A SHARP SURGICAL BLADE TO PUNCTURE OR CUT.: Brand: BARD-PARKER SAFETY BLADES SIZE 15, STERILE

## (undated) DEVICE — 3M™ MEDIPORE™ SOFT CLOTH TAPE, 4 INCH X 10 YARDS, 12 ROLLS/CASE, 2964: Brand: 3M™ MEDIPORE™

## (undated) DEVICE — PAD,ABDOMINAL,5"X9",STERILE,LF,1/PK: Brand: MEDLINE INDUSTRIES, INC.

## (undated) DEVICE — INTENDED FOR TISSUE SEPARATION, AND OTHER PROCEDURES THAT REQUIRE A SHARP SURGICAL BLADE TO PUNCTURE OR CUT.: Brand: BARD-PARKER ® STAINLESS STEEL BLADES

## (undated) DEVICE — SPINE PACK DEPAUL: Brand: MEDLINE INDUSTRIES, INC.

## (undated) DEVICE — NDL SPNE QNCKE 18GX3.5IN LF --

## (undated) DEVICE — SUTURE ABSORBABLE BRAIDED 2-0 CT-1 27 IN UD VICRYL J259H

## (undated) DEVICE — SYR LR LCK 1ML GRAD NSAF 30ML --

## (undated) DEVICE — SYSTEM SKIN CLSR 22CM DERMBND PRINEO

## (undated) DEVICE — ARGYLE FRAZIER SURGICAL SUCTION INSTRUMENT 10 FR/CH (3.3 MM): Brand: ARGYLE

## (undated) DEVICE — NEEDLE HYPO 16GA L1.5IN PUR POLYPR HUB S STL REG BVL STR

## (undated) DEVICE — SYRINGE IRRIG 60ML SFT PLIABLE BLB EZ TO GRP 1 HND USE W/

## (undated) DEVICE — SOL IRRIGATION INJ NACL 0.9% 500ML BTL

## (undated) DEVICE — NDL PRT INJ NSAF BLNT 18GX1.5 --